# Patient Record
Sex: MALE | Race: WHITE | NOT HISPANIC OR LATINO | URBAN - METROPOLITAN AREA
[De-identification: names, ages, dates, MRNs, and addresses within clinical notes are randomized per-mention and may not be internally consistent; named-entity substitution may affect disease eponyms.]

---

## 2017-03-29 ENCOUNTER — OFFICE (OUTPATIENT)
Dept: URBAN - METROPOLITAN AREA CLINIC 75 | Facility: CLINIC | Age: 62
End: 2017-03-29
Payer: COMMERCIAL

## 2017-03-29 VITALS
SYSTOLIC BLOOD PRESSURE: 134 MMHG | DIASTOLIC BLOOD PRESSURE: 70 MMHG | WEIGHT: 254 LBS | HEART RATE: 81 BPM | HEIGHT: 65 IN

## 2017-03-29 DIAGNOSIS — K30 FUNCTIONAL DYSPEPSIA: ICD-10-CM

## 2017-03-29 DIAGNOSIS — K21.9 GASTRO-ESOPHAGEAL REFLUX DISEASE WITHOUT ESOPHAGITIS: ICD-10-CM

## 2017-03-29 DIAGNOSIS — R13.10 DYSPHAGIA, UNSPECIFIED: ICD-10-CM

## 2017-03-29 DIAGNOSIS — R63.5 ABNORMAL WEIGHT GAIN: ICD-10-CM

## 2017-03-29 DIAGNOSIS — Z83.71 FAMILY HISTORY OF COLONIC POLYPS: ICD-10-CM

## 2017-03-29 DIAGNOSIS — R63.0 ANOREXIA: ICD-10-CM

## 2017-03-29 DIAGNOSIS — K59.1 FUNCTIONAL DIARRHEA: ICD-10-CM

## 2017-03-29 DIAGNOSIS — R14.0 ABDOMINAL DISTENSION (GASEOUS): ICD-10-CM

## 2017-03-29 PROCEDURE — 99244 OFF/OP CNSLTJ NEW/EST MOD 40: CPT | Performed by: NURSE PRACTITIONER

## 2019-11-22 ENCOUNTER — OFFICE VISIT (OUTPATIENT)
Dept: RETAIL CLINIC | Facility: CLINIC | Age: 64
End: 2019-11-22

## 2019-11-22 VITALS
OXYGEN SATURATION: 96 % | SYSTOLIC BLOOD PRESSURE: 110 MMHG | HEART RATE: 84 BPM | DIASTOLIC BLOOD PRESSURE: 70 MMHG | TEMPERATURE: 98.4 F | RESPIRATION RATE: 18 BRPM

## 2019-11-22 DIAGNOSIS — L03.116 CELLULITIS OF LEFT LOWER EXTREMITY: Primary | ICD-10-CM

## 2019-11-22 PROCEDURE — 99213 OFFICE O/P EST LOW 20 MIN: CPT | Performed by: NURSE PRACTITIONER

## 2019-11-22 RX ORDER — CEPHALEXIN 500 MG/1
500 CAPSULE ORAL 3 TIMES DAILY
Qty: 21 CAPSULE | Refills: 0 | Status: SHIPPED | OUTPATIENT
Start: 2019-11-22 | End: 2019-11-29

## 2019-11-22 NOTE — PROGRESS NOTES
Subjective   Marcial Ramirez is a 64 y.o. male.     Lower Extremity Issue   This is a new problem. The current episode started in the past 7 days (thought it was some irritation from tape, but worsening). The problem has been gradually worsening. Associated symptoms include a rash. Pertinent negatives include no fever. Associated symptoms comments: No pain, no numbness, tingling  . Nothing aggravates the symptoms. Treatments tried: hydrocortisone cream. The treatment provided mild relief.        The following portions of the patient's history were reviewed and updated as appropriate: allergies, current medications, past family history, past medical history, past social history, past surgical history and problem list.    Review of Systems   Constitutional: Negative for fever.   HENT: Negative.    Respiratory: Negative.    Cardiovascular: Negative.    Gastrointestinal: Negative.    Genitourinary: Negative.    Musculoskeletal: Negative.    Skin: Positive for rash.        Left lower extremity swelling and redness     Neurological: Negative.        Objective   Physical Exam   Constitutional: He is cooperative. No distress.   HENT:   Head: Normocephalic.   Right Ear: Hearing, tympanic membrane, external ear and ear canal normal.   Left Ear: Hearing, tympanic membrane, external ear and ear canal normal.   Nose: Nose normal.   Mouth/Throat: Oropharynx is clear and moist.   Eyes: Conjunctivae, EOM and lids are normal. Pupils are equal, round, and reactive to light.   Neck: Trachea normal and full passive range of motion without pain.   Cardiovascular: Normal rate, regular rhythm and normal pulses.   Pulmonary/Chest: Effort normal and breath sounds normal.   Neurological: He is alert.   Skin: Skin is warm. Capillary refill takes less than 2 seconds.        Psychiatric: He has a normal mood and affect. His speech is normal and behavior is normal.   Vitals reviewed.        Assessment/Plan   Marcial was seen today for  cellulitis.    Diagnoses and all orders for this visit:    Cellulitis of left lower extremity    Other orders  -     cephalexin (KEFLEX) 500 MG capsule; Take 1 capsule by mouth 3 (Three) Times a Day for 7 days.    If any worsening go to ER ASAP, if no better by Monday, call dermatology who did excision and have them evaluate.

## 2019-11-22 NOTE — PATIENT INSTRUCTIONS
Cellulitis, Adult    Cellulitis is a skin infection. The infected area is usually warm, red, swollen, and tender. This condition occurs most often in the arms and lower legs. The infection can travel to the muscles, blood, and underlying tissue and become serious. It is very important to get treated for this condition.  What are the causes?  Cellulitis is caused by bacteria. The bacteria enter through a break in the skin, such as a cut, burn, insect bite, open sore, or crack.  What increases the risk?  This condition is more likely to occur in people who:  · Have a weak body defense system (immune system).  · Have open wounds on the skin, such as cuts, burns, bites, and scrapes. Bacteria can enter the body through these open wounds.  · Are older than 60 years of age.  · Have diabetes.  · Have a type of long-lasting (chronic) liver disease (cirrhosis) or kidney disease.  · Are obese.  · Have a skin condition such as:  ? Itchy rash (eczema).  ? Slow movement of blood in the veins (venous stasis).  ? Fluid buildup below the skin (edema).  · Have had radiation therapy.  · Use IV drugs.  What are the signs or symptoms?  Symptoms of this condition include:  · Redness, streaking, or spotting on the skin.  · Swollen area of the skin.  · Tenderness or pain when an area of the skin is touched.  · Warm skin.  · A fever.  · Chills.  · Blisters.  How is this diagnosed?  This condition is diagnosed based on a medical history and physical exam. You may also have tests, including:  · Blood tests.  · Imaging tests.  How is this treated?  Treatment for this condition may include:  · Medicines, such as antibiotic medicines or medicines to treat allergies (antihistamines).  · Supportive care, such as rest and application of cold or warm cloths (compresses) to the skin.  · Hospital care, if the condition is severe.  The infection usually starts to get better within 1-2 days of treatment.  Follow these instructions at  home:    Medicines  · Take over-the-counter and prescription medicines only as told by your health care provider.  · If you were prescribed an antibiotic medicine, take it as told by your health care provider. Do not stop taking the antibiotic even if you start to feel better.  General instructions  · Drink enough fluid to keep your urine pale yellow.  · Do not touch or rub the infected area.  · Raise (elevate) the infected area above the level of your heart while you are sitting or lying down.  · Apply warm or cold compresses to the affected area as told by your health care provider.  · Keep all follow-up visits as told by your health care provider. This is important. These visits let your health care provider make sure a more serious infection is not developing.  Contact a health care provider if:  · You have a fever.  · Your symptoms do not begin to improve within 1-2 days of starting treatment.  · Your bone or joint underneath the infected area becomes painful after the skin has healed.  · Your infection returns in the same area or another area.  · You notice a swollen bump in the infected area.  · You develop new symptoms.  · You have a general ill feeling (malaise) with muscle aches and pains.  Get help right away if:  · Your symptoms get worse.  · You feel very sleepy.  · You develop vomiting or diarrhea that persists.  · You notice red streaks coming from the infected area.  · Your red area gets larger or turns dark in color.  These symptoms may represent a serious problem that is an emergency. Do not wait to see if the symptoms will go away. Get medical help right away. Call your local emergency services (911 in the [place] Do not drive yourself to the hospital.  Summary  · Cellulitis is a skin infection. This condition occurs most often in the arms and lower legs.  · Treatment for this condition may include medicines, such as antibiotic medicines or antihistamines.  · Take over-the-counter and prescription  medicines only as told by your health care provider. If you were prescribed an antibiotic medicine, do not stop taking the antibiotic even if you start to feel better.  · Contact a health care provider if your symptoms do not begin to improve within 1-2 days of starting treatment or your symptoms get worse.  · Keep all follow-up visits as told by your health care provider. This is important. These visits let your health care provider make sure that a more serious infection is not developing.  This information is not intended to replace advice given to you by your health care provider. Make sure you discuss any questions you have with your health care provider.  Document Released: 09/27/2006 Document Revised: 05/09/2019 Document Reviewed: 05/09/2019  [employer] Interactive Patient Education © 2019 [employer]

## 2020-10-12 ENCOUNTER — HOSPITAL ENCOUNTER (EMERGENCY)
Facility: HOSPITAL | Age: 65
Discharge: LEFT BEFORE DISCHARGE | End: 2020-10-12

## 2020-10-12 VITALS — RESPIRATION RATE: 16 BRPM | TEMPERATURE: 98.2 F | HEART RATE: 98 BPM | OXYGEN SATURATION: 93 %

## 2020-10-12 NOTE — ED TRIAGE NOTES
Feels soa c exertion.  Seen at  today.  hsa been coughing, sneezing.        Patient was placed in face mask during first look triage.  Patient was wearing a face mask throughout encounter.  I wore personal protective equipment throughout the encounter.  Hand hygiene was performed before and after patient encounter.

## 2020-10-13 ENCOUNTER — APPOINTMENT (OUTPATIENT)
Dept: GENERAL RADIOLOGY | Facility: HOSPITAL | Age: 65
End: 2020-10-13

## 2020-10-13 ENCOUNTER — HOSPITAL ENCOUNTER (EMERGENCY)
Facility: HOSPITAL | Age: 65
Discharge: ANOTHER HOSPITAL | End: 2020-10-13
Attending: EMERGENCY MEDICINE | Admitting: EMERGENCY MEDICINE

## 2020-10-13 ENCOUNTER — APPOINTMENT (OUTPATIENT)
Dept: CT IMAGING | Facility: HOSPITAL | Age: 65
End: 2020-10-13

## 2020-10-13 VITALS
TEMPERATURE: 98.9 F | HEART RATE: 93 BPM | HEIGHT: 65 IN | SYSTOLIC BLOOD PRESSURE: 168 MMHG | WEIGHT: 261 LBS | DIASTOLIC BLOOD PRESSURE: 95 MMHG | OXYGEN SATURATION: 93 % | RESPIRATION RATE: 18 BRPM | BODY MASS INDEX: 43.49 KG/M2

## 2020-10-13 DIAGNOSIS — R06.09 EXERTIONAL DYSPNEA: Primary | ICD-10-CM

## 2020-10-13 LAB
ALBUMIN SERPL-MCNC: 4 G/DL (ref 3.5–5.2)
ALBUMIN/GLOB SERPL: 1.2 G/DL
ALP SERPL-CCNC: 65 U/L (ref 39–117)
ALT SERPL W P-5'-P-CCNC: 27 U/L (ref 1–41)
ANION GAP SERPL CALCULATED.3IONS-SCNC: 10.8 MMOL/L (ref 5–15)
AST SERPL-CCNC: 26 U/L (ref 1–40)
B PARAPERT DNA SPEC QL NAA+PROBE: NOT DETECTED
B PERT DNA SPEC QL NAA+PROBE: NOT DETECTED
BASOPHILS # BLD AUTO: 0.06 10*3/MM3 (ref 0–0.2)
BASOPHILS NFR BLD AUTO: 0.7 % (ref 0–1.5)
BILIRUB SERPL-MCNC: 0.6 MG/DL (ref 0–1.2)
BUN SERPL-MCNC: 15 MG/DL (ref 8–23)
BUN/CREAT SERPL: 16.3 (ref 7–25)
C PNEUM DNA NPH QL NAA+NON-PROBE: NOT DETECTED
CALCIUM SPEC-SCNC: 9.6 MG/DL (ref 8.6–10.5)
CHLORIDE SERPL-SCNC: 101 MMOL/L (ref 98–107)
CO2 SERPL-SCNC: 27.2 MMOL/L (ref 22–29)
CREAT SERPL-MCNC: 0.92 MG/DL (ref 0.76–1.27)
D DIMER PPP FEU-MCNC: <0.27 MCGFEU/ML (ref 0–0.49)
D-LACTATE SERPL-SCNC: 1.2 MMOL/L (ref 0.5–2)
DEPRECATED RDW RBC AUTO: 39.9 FL (ref 37–54)
EOSINOPHIL # BLD AUTO: 0.18 10*3/MM3 (ref 0–0.4)
EOSINOPHIL NFR BLD AUTO: 2.2 % (ref 0.3–6.2)
ERYTHROCYTE [DISTWIDTH] IN BLOOD BY AUTOMATED COUNT: 12.3 % (ref 12.3–15.4)
FLUAV H1 2009 PAND RNA NPH QL NAA+PROBE: NOT DETECTED
FLUAV H1 HA GENE NPH QL NAA+PROBE: NOT DETECTED
FLUAV H3 RNA NPH QL NAA+PROBE: NOT DETECTED
FLUAV SUBTYP SPEC NAA+PROBE: NOT DETECTED
FLUBV RNA ISLT QL NAA+PROBE: NOT DETECTED
GFR SERPL CREATININE-BSD FRML MDRD: 82 ML/MIN/1.73
GLOBULIN UR ELPH-MCNC: 3.4 GM/DL
GLUCOSE SERPL-MCNC: 87 MG/DL (ref 65–99)
HADV DNA SPEC NAA+PROBE: NOT DETECTED
HCOV 229E RNA SPEC QL NAA+PROBE: NOT DETECTED
HCOV HKU1 RNA SPEC QL NAA+PROBE: NOT DETECTED
HCOV NL63 RNA SPEC QL NAA+PROBE: NOT DETECTED
HCOV OC43 RNA SPEC QL NAA+PROBE: NOT DETECTED
HCT VFR BLD AUTO: 48 % (ref 37.5–51)
HGB BLD-MCNC: 16.6 G/DL (ref 13–17.7)
HMPV RNA NPH QL NAA+NON-PROBE: NOT DETECTED
HOLD SPECIMEN: NORMAL
HOLD SPECIMEN: NORMAL
HPIV1 RNA SPEC QL NAA+PROBE: NOT DETECTED
HPIV2 RNA SPEC QL NAA+PROBE: NOT DETECTED
HPIV3 RNA NPH QL NAA+PROBE: NOT DETECTED
HPIV4 P GENE NPH QL NAA+PROBE: NOT DETECTED
IMM GRANULOCYTES # BLD AUTO: 0.05 10*3/MM3 (ref 0–0.05)
IMM GRANULOCYTES NFR BLD AUTO: 0.6 % (ref 0–0.5)
LYMPHOCYTES # BLD AUTO: 2.13 10*3/MM3 (ref 0.7–3.1)
LYMPHOCYTES NFR BLD AUTO: 26.4 % (ref 19.6–45.3)
M PNEUMO IGG SER IA-ACNC: NOT DETECTED
MCH RBC QN AUTO: 31 PG (ref 26.6–33)
MCHC RBC AUTO-ENTMCNC: 34.6 G/DL (ref 31.5–35.7)
MCV RBC AUTO: 89.7 FL (ref 79–97)
MONOCYTES # BLD AUTO: 0.6 10*3/MM3 (ref 0.1–0.9)
MONOCYTES NFR BLD AUTO: 7.4 % (ref 5–12)
NEUTROPHILS NFR BLD AUTO: 5.05 10*3/MM3 (ref 1.7–7)
NEUTROPHILS NFR BLD AUTO: 62.7 % (ref 42.7–76)
NRBC BLD AUTO-RTO: 0 /100 WBC (ref 0–0.2)
NT-PROBNP SERPL-MCNC: 44.3 PG/ML (ref 0–900)
PLATELET # BLD AUTO: 202 10*3/MM3 (ref 140–450)
PMV BLD AUTO: 8.4 FL (ref 6–12)
POTASSIUM SERPL-SCNC: 4.1 MMOL/L (ref 3.5–5.2)
PROCALCITONIN SERPL-MCNC: 0.09 NG/ML (ref 0–0.25)
PROT SERPL-MCNC: 7.4 G/DL (ref 6–8.5)
RBC # BLD AUTO: 5.35 10*6/MM3 (ref 4.14–5.8)
RHINOVIRUS RNA SPEC NAA+PROBE: DETECTED
RSV RNA NPH QL NAA+NON-PROBE: NOT DETECTED
SARS-COV-2 RNA NPH QL NAA+NON-PROBE: NOT DETECTED
SODIUM SERPL-SCNC: 139 MMOL/L (ref 136–145)
TROPONIN T SERPL-MCNC: <0.01 NG/ML (ref 0–0.03)
WBC # BLD AUTO: 8.07 10*3/MM3 (ref 3.4–10.8)
WHOLE BLOOD HOLD SPECIMEN: NORMAL
WHOLE BLOOD HOLD SPECIMEN: NORMAL

## 2020-10-13 PROCEDURE — 36415 COLL VENOUS BLD VENIPUNCTURE: CPT

## 2020-10-13 PROCEDURE — 99283 EMERGENCY DEPT VISIT LOW MDM: CPT

## 2020-10-13 PROCEDURE — 85025 COMPLETE CBC W/AUTO DIFF WBC: CPT | Performed by: EMERGENCY MEDICINE

## 2020-10-13 PROCEDURE — 80053 COMPREHEN METABOLIC PANEL: CPT | Performed by: EMERGENCY MEDICINE

## 2020-10-13 PROCEDURE — 83880 ASSAY OF NATRIURETIC PEPTIDE: CPT | Performed by: EMERGENCY MEDICINE

## 2020-10-13 PROCEDURE — 85379 FIBRIN DEGRADATION QUANT: CPT | Performed by: EMERGENCY MEDICINE

## 2020-10-13 PROCEDURE — 84145 PROCALCITONIN (PCT): CPT | Performed by: PHYSICIAN ASSISTANT

## 2020-10-13 PROCEDURE — 0202U NFCT DS 22 TRGT SARS-COV-2: CPT | Performed by: PHYSICIAN ASSISTANT

## 2020-10-13 PROCEDURE — 93005 ELECTROCARDIOGRAM TRACING: CPT

## 2020-10-13 PROCEDURE — 93005 ELECTROCARDIOGRAM TRACING: CPT | Performed by: EMERGENCY MEDICINE

## 2020-10-13 PROCEDURE — 71046 X-RAY EXAM CHEST 2 VIEWS: CPT

## 2020-10-13 PROCEDURE — 84484 ASSAY OF TROPONIN QUANT: CPT | Performed by: EMERGENCY MEDICINE

## 2020-10-13 PROCEDURE — 83605 ASSAY OF LACTIC ACID: CPT | Performed by: PHYSICIAN ASSISTANT

## 2020-10-13 PROCEDURE — 93010 ELECTROCARDIOGRAM REPORT: CPT | Performed by: INTERNAL MEDICINE

## 2020-10-13 RX ORDER — SODIUM CHLORIDE 0.9 % (FLUSH) 0.9 %
10 SYRINGE (ML) INJECTION AS NEEDED
Status: DISCONTINUED | OUTPATIENT
Start: 2020-10-13 | End: 2020-10-13 | Stop reason: HOSPADM

## 2020-10-13 NOTE — ED NOTES
Patient has on mask, nurse has on mask, gown, safety glasses and face shield. I washed hands before and after entering the room.     Nataly Nielson, RN  10/13/20 1838

## 2020-10-13 NOTE — ED PROVIDER NOTES
I supervised care provided by the midlevel provider.   We have discussed this patient's history, physical exam, and treatment plan.  I have reviewed the note and personally saw and examined the patient and agree with the plan of care.   I have seen and evaluated this gentleman.  He has a 4-day history of an increased cough, shortness of breath.  His shortness of breath is really with exertion.  It is not much exertion at all just getting up to walk across the room he becomes very short of breath.  He is currently not an active smoker but is smoked in the past.  He reports no history of asthma or COPD.  He reports no history of heart disease.  He does not see a doctor regularly.    GENERAL: Patient's O2 sat on room air is 93%.  Patient's heart rate is 90 low 100s and his blood pressure is a little high.  Patient is afebrile here  HENT: nares patent  Head/neck/ face are symmetric without gross deformity or swelling  EYES: no scleral icterus  CV: regular rhythm, regular rate with intact distal pulses no murmur or rub  RESPIRATORY: Mild respiratory distress.  Patient has a mild cough.  Patient has a very mild expiratory wheeze.  No rales or rhonchi appreciated in lung fields.  ABDOMEN: soft and non-tender morbidly obese  MUSCULOSKELETAL: no deformity.  1+ edema to ankles and feet.  Intact distal pulses.  NEURO: alert and appropriate, moves all extremities, follows commands  SKIN: warm, dry    Vital signs and nursing notes reviewed.    Plan I reviewed EKG and lab work.  Chest x-ray also was reviewed and essentially unremarkable.  No acute process has some elevation of the right hemidiaphragm.  We tried to get a CT angiogram of his chest.  Patient could not lay flat.  Patient has a long history of inability to lay flat.  He sleeps sitting up at baseline.  This occurs suddenly and I think this is likely related to anxiety.    I informed patient that he will need to be admitted to the hospital.  We have a dimer pending.  If  the dimer is negative we will not get a CT angiogram.  I spoke with the patient at length and informed him of my concerns.  All questions answered.  We are currently under a pandemic from the COVID19 infection.  The patient presented to the emergency department by ambulance or personal vehicle. I followed the current protocols required by Infection Control at [facility] in my evaluation and treatment of the patient. The patient was wearing a face mask during my evaluation and throughout my encounter. During my whole encounter with this patient I used appropriate personal protective equipment.  This equipment consisted of eye protection, facemask, gown, and gloves.  I applied this equipment before entering the room.      I reviewed old records.  No previous work-up of cardiac.  See that he is got chronic nausea.  ED Course as of Oct 13 1619   Tue Oct 13, 2020   1147 Patient presents with 5-day history of exertional dyspnea, cough.  Differential diagnoses include but not limited to congestive heart failure, COVID-19, pneumonia, allergic rhinitis.    [EE]   1155 Chest x-ray interpreted by myself shows no acute effusion or infiltrate.  Patient does have an elevated right hemidiaphragm.  I will await final radiologist interpretation.    [EE]   1251 Patient with normal chest x-ray and EKG.  I am concerned that patient is still tachycardic.  Plan to obtain CTA to rule out pulmonary embolism.    [EE]   1251 WBC: 8.07 [EE]   1251 Hemoglobin: 16.6 [EE]   1420 Patient unable to tolerate CT scan secondary to Gary flap.  Will obtain D-dimer and reevaluate.    [EE]   1504 Human Rhinovirus/Enterovirus(!): Detected [EE]   1504 D-Dimer, Quant: <0.27 [EE]   1513 I have reevaluated the patient.  The dimer is negative.  It makes it incredibly unlikely that he has a DVT or PE.  I was going to admit the patient here to our hospital but the patient does not have insurance that is covered here.  I informed him that I am  going to put on the chart that I do not think that is medically safe for him to leave or to be transferred.  I informed him I have no definitive certainty that the insurance company will not charge him.  He states that he does not want to take that risk.  And that he is going to get up and leave AGAINST MEDICAL ADVICE.  I stated the next best alternative is for me to try to arrange transfer with an ACLS crew to the hospital.  [facility] and [facility] is their choice.  Given the fact that he will not stay here and I think it is a much greater risk if he leaves A then if he was transferred to another hospital with an ACLS unit.  I will try to appeal to his wishes and arrange that.  I discussed with him as well as his brother and sister-in-law.    [MM]   1554 I spoke with Abbie who is the nurse practitioner that is on for the hospitalist at [facility].  She spoke with Dr. Egan and they accepted this patient in transfer.  We can transfer the patient by ACLS.  I explained to Abbie the patient's presenting symptoms and the results of her test here.    [MM]   1555 Patient's dimer is negative so I canceled the venous Doppler.    [MM]   1555 I was going to do the Doppler because the patient could not lay flat for a CT scan.  It was an indirect way to look and evaluate for DVTs.  As his dimer came back negative and he has had no new swelling or pain in his lower extremities we will cancel the venous Doppler.    [MM]      ED Course User Index  [EE] Evans Carvajal PA  [MM] Myron Lucero MD     Patient is remained stable down here in the ER prior to transfer.     Myron Lucero MD  10/13/20 1904

## 2020-10-13 NOTE — ED NOTES
Patient waiting to be transferred, Deric was unsure about bed status. Patient updated.      Nataly Nielson, RN  10/13/20 [id number]

## 2020-10-13 NOTE — PROGRESS NOTES
[facility] informed of need to transport by ALS to [facility], Room 731. ETA [phone]. Galina Arredondo RN

## 2020-10-13 NOTE — PROGRESS NOTES
"After decision that patient needs to be admitted and is not stable enough for transfer at present time per Dr. Lucero, spoke to patient on phone and explained that non-par insurance will NOT affect ER visit, but his insurance is out of network for an inpatient stay. Explained that he may be approached again about ? transfer at some point during his hospital stay. Explained that this CM could attempt to arrange transfer to a [facility], [facility] or [facility]. Stated that he did not want to do this and asked to be discharged, stating that he would go to one of those facilities \"if he started feeling bad again\". Explained that this may involved signing out AMA, rather than being discharged, and that Dr. Cuevas would speak w/ him. Dr. Lucero updated  "

## 2020-10-13 NOTE — ED PROVIDER NOTES
EMERGENCY DEPARTMENT ENCOUNTER    Room Number:  17/17  Date of encounter:  10/13/2020  PCP: Provider, No Known  Historian: Patient      I used full protective equipment while examining this patient.  This includes face mask, gloves and protective eyewear.  I washed my hands before entering the room and immediately upon leaving the room      HPI:  Chief Complaint: Shortness of breath  A complete HPI/ROS/PMH/PSH/SH/FH are unobtainable due to: Nothing    Context: Marcial Ramirez is a 65 y.o. male who presents to the ED c/o approximate 5-day history of exertional dyspnea as well as cough.  Patient states the symptoms started mildly and have progressed.  Patient states that this time he has difficulty ambulating further than 20 feet without becoming extremely dyspneic.  Patient states that at rest he feels much better.  He states he does have chronic orthopnea, which he has always related to his weight.  Patient states he has had a mildly productive cough.  He denies any fever, sore throat, anosmia, myalgias.  He denies any significant pedal edema or chest pain.  He denies any known Covid exposure.    Review of Medical Records  I reviewed urgent care visit from 10/12/2020.  Patient was encouraged to be seen in the ER.    PAST MEDICAL HISTORY  Active Ambulatory Problems     Diagnosis Date Noted   • Allergic rhinitis due to allergen 10/10/2016   • Cough 10/10/2016   • Nausea 10/10/2016   • Edema 10/10/2016   • Obesity due to excess calories 10/10/2016     Resolved Ambulatory Problems     Diagnosis Date Noted   • No Resolved Ambulatory Problems     Past Medical History:   Diagnosis Date   • GERD (gastroesophageal reflux disease)          PAST SURGICAL HISTORY  Past Surgical History:   Procedure Laterality Date   • HERNIA REPAIR     • SKIN CANCER EXCISION      left lower leg         FAMILY HISTORY  Family History   Problem Relation Age of Onset   • Heart disease Mother    • Heart failure Mother    • Heart disease Father    • COPD  Father    • Stroke Brother          SOCIAL HISTORY  Social History     Socioeconomic History   • Marital status:      Spouse name: Not on file   • Number of children: Not on file   • Years of education: Not on file   • Highest education level: Not on file   Substance and Sexual Activity   • Alcohol use: Yes     Alcohol/week: 21.0 standard drinks     Types: 21 Glasses of wine per week   • Drug use: No   • Sexual activity: Defer         ALLERGIES  Patient has no known allergies.        REVIEW OF SYSTEMS  All systems reviewed and negative except for those discussed in HPI.       PHYSICAL EXAM    I have reviewed the triage vital signs and nursing notes.    ED Triage Vitals [10/13/20 0910]   Temp Heart Rate Resp BP SpO2   97.6 °F (36.4 °C) 116 18 -- 93 %      Temp src Heart Rate Source Patient Position BP Location FiO2 (%)   Tympanic Monitor -- -- --       Physical Exam  GENERAL: Alert, oriented, not distressed  HENT: nares patent, head atraumatic  EYES: no scleral icterus, EOMI  CV: regular rhythm, tachycardic rate, no murmur  RESPIRATORY: normal effort, CTA  ABDOMEN: soft, nontender, obese  MUSCULOSKELETAL: no deformity, FROM, mild pedal edema  NEURO: alert, moves all extremities, follows commands  SKIN: warm, dry, mild hyperpigmentation of the lower extremities        LAB RESULTS  Recent Results (from the past 24 hour(s))   Comprehensive Metabolic Panel    Collection Time: 10/13/20 12:22 PM    Specimen: Blood   Result Value Ref Range    Glucose 87 65 - 99 mg/dL    BUN 15 8 - 23 mg/dL    Creatinine 0.92 0.76 - 1.27 mg/dL    Sodium 139 136 - 145 mmol/L    Potassium 4.1 3.5 - 5.2 mmol/L    Chloride 101 98 - 107 mmol/L    CO2 27.2 22.0 - 29.0 mmol/L    Calcium 9.6 8.6 - 10.5 mg/dL    Total Protein 7.4 6.0 - 8.5 g/dL    Albumin 4.00 3.50 - 5.20 g/dL    ALT (SGPT) 27 1 - 41 U/L    AST (SGOT) 26 1 - 40 U/L    Alkaline Phosphatase 65 39 - 117 U/L    Total Bilirubin 0.6 0.0 - 1.2 mg/dL    eGFR Non African Amer 82 >60  mL/min/1.73    Globulin 3.4 gm/dL    A/G Ratio 1.2 g/dL    BUN/Creatinine Ratio 16.3 7.0 - 25.0    Anion Gap 10.8 5.0 - 15.0 mmol/L   BNP    Collection Time: 10/13/20 12:22 PM    Specimen: Blood   Result Value Ref Range    proBNP 44.3 0.0 - 900.0 pg/mL   Troponin    Collection Time: 10/13/20 12:22 PM    Specimen: Blood   Result Value Ref Range    Troponin T <0.010 0.000 - 0.030 ng/mL   Light Blue Top    Collection Time: 10/13/20 12:22 PM   Result Value Ref Range    Extra Tube hold for add-on    Green Top (Gel)    Collection Time: 10/13/20 12:22 PM   Result Value Ref Range    Extra Tube Hold for add-ons.    Lavender Top    Collection Time: 10/13/20 12:22 PM   Result Value Ref Range    Extra Tube hold for add-on    Gold Top - SST    Collection Time: 10/13/20 12:22 PM   Result Value Ref Range    Extra Tube Hold for add-ons.    CBC Auto Differential    Collection Time: 10/13/20 12:22 PM    Specimen: Blood   Result Value Ref Range    WBC 8.07 3.40 - 10.80 10*3/mm3    RBC 5.35 4.14 - 5.80 10*6/mm3    Hemoglobin 16.6 13.0 - 17.7 g/dL    Hematocrit 48.0 37.5 - 51.0 %    MCV 89.7 79.0 - 97.0 fL    MCH 31.0 26.6 - 33.0 pg    MCHC 34.6 31.5 - 35.7 g/dL    RDW 12.3 12.3 - 15.4 %    RDW-SD 39.9 37.0 - 54.0 fl    MPV 8.4 6.0 - 12.0 fL    Platelets 202 140 - 450 10*3/mm3    Neutrophil % 62.7 42.7 - 76.0 %    Lymphocyte % 26.4 19.6 - 45.3 %    Monocyte % 7.4 5.0 - 12.0 %    Eosinophil % 2.2 0.3 - 6.2 %    Basophil % 0.7 0.0 - 1.5 %    Immature Grans % 0.6 (H) 0.0 - 0.5 %    Neutrophils, Absolute 5.05 1.70 - 7.00 10*3/mm3    Lymphocytes, Absolute 2.13 0.70 - 3.10 10*3/mm3    Monocytes, Absolute 0.60 0.10 - 0.90 10*3/mm3    Eosinophils, Absolute 0.18 0.00 - 0.40 10*3/mm3    Basophils, Absolute 0.06 0.00 - 0.20 10*3/mm3    Immature Grans, Absolute 0.05 0.00 - 0.05 10*3/mm3    nRBC 0.0 0.0 - 0.2 /100 WBC   Procalcitonin    Collection Time: 10/13/20 12:22 PM    Specimen: Blood   Result Value Ref Range    Procalcitonin 0.09 0.00 - 0.25  ng/mL   Lactic Acid, Plasma    Collection Time: 10/13/20 12:22 PM    Specimen: Blood   Result Value Ref Range    Lactate 1.2 0.5 - 2.0 mmol/L   Respiratory Panel PCR w/COVID-19(SARS-CoV-2) [facility] In-House, NP Swab in UTM/VTM, 3-4 HR TAT - Swab, Nasopharynx    Collection Time: 10/13/20 12:24 PM    Specimen: Nasopharynx; Swab   Result Value Ref Range    ADENOVIRUS, PCR Not Detected Not Detected    Coronavirus 229E Not Detected Not Detected    Coronavirus HKU1 Not Detected Not Detected    Coronavirus NL63 Not Detected Not Detected    Coronavirus OC43 Not Detected Not Detected    COVID19 Not Detected Not Detected - Ref. Range    Human Metapneumovirus Not Detected Not Detected    Human Rhinovirus/Enterovirus Detected (A) Not Detected    Influenza A PCR Not Detected Not Detected    Influenza A H1 Not Detected Not Detected    Influenza A H1 2009 PCR Not Detected Not Detected    Influenza A H3 Not Detected Not Detected    Influenza B PCR Not Detected Not Detected    Parainfluenza Virus 1 Not Detected Not Detected    Parainfluenza Virus 2 Not Detected Not Detected    Parainfluenza Virus 3 Not Detected Not Detected    Parainfluenza Virus 4 Not Detected Not Detected    RSV, PCR Not Detected Not Detected    Bordetella pertussis pcr Not Detected Not Detected    Bordetella parapertussis PCR Not Detected Not Detected    Chlamydophila pneumoniae PCR Not Detected Not Detected    Mycoplasma pneumo by PCR Not Detected Not Detected   D-dimer, Quantitative    Collection Time: 10/13/20  2:32 PM    Specimen: Blood   Result Value Ref Range    D-Dimer, Quantitative <0.27 0.00 - 0.49 MCGFEU/mL       Ordered the above labs and independently reviewed the results.        RADIOLOGY  Xr Chest 2 View    Result Date: 10/13/2020  XR CHEST 2 VW-  HISTORY:  Shortness of air, congestion.  COMPARISON:  None.  FINDINGS:  A single portable view of the chest was obtained. The cardiac silhouette and mediastinal and hilar contours are within  normal limits. There is calcific aortic atherosclerosis. There is mild linear subsegmental atelectasis versus scarring in the right lung base. There is no focal consolidation. Pleural spaces are clear. There is asymmetric elevation of the right hemidiaphragm. There are chronic appearing thoracolumbar compression fractures. There is multilevel degenerative disc disease.  CONCLUSION(S):   1.  Asymmetric elevation of the right hemidiaphragm with adjacent subsegmental atelectasis versus scarring. No focal consolidation or effusion.  This report was finalized on 10/13/2020 12:05 PM by Dr. Emely Flowers M.D.        I ordered the above noted radiological studies. Reviewed by me and discussed with radiologist.  See dictation for official radiology interpretation.      MEDICATIONS GIVEN IN ER    Medications   sodium chloride 0.9 % flush 10 mL (has no administration in time range)         PROGRESS, DATA ANALYSIS, CONSULTS, AND MEDICAL DECISION MAKING    All labs have been independently reviewed by me.  All radiology studies have been reviewed by me and discussed with radiologist dictating the report.   EKG's independently viewed and interpreted by me.  Discussion below represents my analysis of pertinent findings related to patient's condition, differential diagnosis, treatment plan and final disposition.    I have discussed case with Dr. Lucero, emergency room physician.  He has performed his own bedside examination and agrees with treatment plan.    ED Course as of Oct 13 1918   Tue Oct 13, 2020   1147 Patient presents with 5-day history of exertional dyspnea, cough.  Differential diagnoses include but not limited to congestive heart failure, COVID-19, pneumonia, allergic rhinitis.    [EE]   1155 Chest x-ray interpreted by myself shows no acute effusion or infiltrate.  Patient does have an elevated right hemidiaphragm.  I will await final radiologist interpretation.    [EE]   1251 Patient with normal chest x-ray and EKG.   I am concerned that patient is still tachycardic.  Plan to obtain CTA to rule out pulmonary embolism.    [EE]   1251 WBC: 8.07 [EE]   1251 Hemoglobin: 16.6 [EE]   1420 Patient unable to tolerate CT scan secondary to Gary flap.  Will obtain D-dimer and reevaluate.    [EE]   1504 Human Rhinovirus/Enterovirus(!): Detected [EE]   1504 D-Dimer, Quant: <0.27 [EE]   1513 I have reevaluated the patient.  The dimer is negative.  It makes it incredibly unlikely that he has a DVT or PE.  I was going to admit the patient here to our hospital but the patient does not have insurance that is covered here.  I informed him that I am going to put on the chart that I do not think that is medically safe for him to leave or to be transferred.  I informed him I have no definitive certainty that the insurance company will not charge him.  He states that he does not want to take that risk.  And that he is going to get up and leave AGAINST MEDICAL ADVICE.  I stated the next best alternative is for me to try to arrange transfer with an ACLS crew to the hospital.  [facility] and [facility] is their choice.  Given the fact that he will not stay here and I think it is a much greater risk if he leaves A then if he was transferred to another hospital with an ACLS unit.  I will try to appeal to his wishes and arrange that.  I discussed with him as well as his brother and sister-in-law.    [MM]   1554 I spoke with Abbie who is the nurse practitioner that is on for the hospitalist at [facility].  She spoke with Dr. Egan and they accepted this patient in transfer.  We can transfer the patient by ACLS.  I explained to Abbie the patient's presenting symptoms and the results of her test here.    [MM]   1555 Patient's dimer is negative so I canceled the venous Doppler.    [MM]   1555 I was going to do the Doppler because the patient could not lay flat for a CT scan.  It was an indirect way to look and evaluate for DVTs.  As  his dimer came back negative and he has had no new swelling or pain in his lower extremities we will cancel the venous Doppler.    [MM]      ED Course User Index  [EE] Evans Carvajal PA  [MM] Myron Lucero MD       AS OF 19:18 EDT VITALS:    BP - 160/90  HR - 116  TEMP - 97.6 °F (36.4 °C) (Tympanic)  O2 SATS - 93%        DIAGNOSIS  Final diagnoses:   Exertional dyspnea         DISPOSITION  Transferred           Evans Carvajal PA  10/13/20 1918

## 2020-10-13 NOTE — PROGRESS NOTES
Dr. Lucero spoke w/ patient and family, and patient requests to be transferred to [facility]. Per request, called [facility] at [place] and spoke w/ Inocencia. Requested that hospitalist call Dr. Lucero to discuss transfer. Inocencia stated that they have no open beds at present time, but may have some discharges this evening. Dr. Lucero updated

## 2022-01-11 ENCOUNTER — IMMUNIZATION (OUTPATIENT)
Dept: VACCINE CLINIC | Facility: HOSPITAL | Age: 67
End: 2022-01-11

## 2022-01-11 PROCEDURE — 0004A HC ADM SARSCOV2 30MCG/0.3ML BOOSTER: CPT | Performed by: INTERNAL MEDICINE

## 2022-01-11 PROCEDURE — 91300 HC SARSCOV02 VAC 30MCG/0.3ML IM: CPT | Performed by: INTERNAL MEDICINE

## 2024-02-23 ENCOUNTER — HOSPITAL ENCOUNTER (EMERGENCY)
Facility: HOSPITAL | Age: 69
Discharge: HOME | End: 2024-02-23
Attending: STUDENT IN AN ORGANIZED HEALTH CARE EDUCATION/TRAINING PROGRAM
Payer: MEDICARE

## 2024-02-23 ENCOUNTER — APPOINTMENT (OUTPATIENT)
Dept: GENERAL RADIOLOGY | Facility: HOSPITAL | Age: 69
End: 2024-02-23
Payer: MEDICARE

## 2024-02-23 VITALS
OXYGEN SATURATION: 94 % | HEART RATE: 66 BPM | BODY MASS INDEX: 39.15 KG/M2 | HEIGHT: 65 IN | TEMPERATURE: 98.1 F | SYSTOLIC BLOOD PRESSURE: 148 MMHG | DIASTOLIC BLOOD PRESSURE: 91 MMHG | RESPIRATION RATE: 16 BRPM | WEIGHT: 235 LBS

## 2024-02-23 DIAGNOSIS — I10 HYPERTENSION, UNSPECIFIED TYPE: ICD-10-CM

## 2024-02-23 DIAGNOSIS — R07.9 CHEST PAIN, UNSPECIFIED TYPE: Primary | ICD-10-CM

## 2024-02-23 LAB
ALBUMIN SERPL-MCNC: 4 G/DL (ref 3.5–5.2)
ALBUMIN/GLOB SERPL: 1.3 G/DL
ALP SERPL-CCNC: 73 U/L (ref 39–117)
ALT SERPL W P-5'-P-CCNC: 11 U/L (ref 1–41)
ANION GAP SERPL CALCULATED.3IONS-SCNC: 15.2 MMOL/L (ref 5–15)
AST SERPL-CCNC: 8 U/L (ref 1–40)
BASOPHILS # BLD AUTO: 0.05 10*3/MM3 (ref 0–0.2)
BASOPHILS NFR BLD AUTO: 0.5 % (ref 0–1.5)
BILIRUB SERPL-MCNC: 0.8 MG/DL (ref 0–1.2)
BUN SERPL-MCNC: 10 MG/DL (ref 8–23)
BUN/CREAT SERPL: 12 (ref 7–25)
CALCIUM SPEC-SCNC: 8.5 MG/DL (ref 8.6–10.5)
CHLORIDE SERPL-SCNC: 108 MMOL/L (ref 98–107)
CO2 SERPL-SCNC: 18.8 MMOL/L (ref 22–29)
CREAT SERPL-MCNC: 0.83 MG/DL (ref 0.76–1.27)
D DIMER PPP FEU-MCNC: 0.27 MCGFEU/ML (ref 0–0.71)
DEPRECATED RDW RBC AUTO: 38.4 FL (ref 37–54)
EGFRCR SERPLBLD CKD-EPI 2021: 93.6 ML/MIN/1.73
EOSINOPHIL # BLD AUTO: 0.04 10*3/MM3 (ref 0–0.4)
EOSINOPHIL NFR BLD AUTO: 0.4 % (ref 0.3–6.2)
ERYTHROCYTE [DISTWIDTH] IN BLOOD BY AUTOMATED COUNT: 12 % (ref 12.3–15.4)
GLOBULIN UR ELPH-MCNC: 3 GM/DL
GLUCOSE SERPL-MCNC: 92 MG/DL (ref 65–99)
HCT VFR BLD AUTO: 47.1 % (ref 37.5–51)
HGB BLD-MCNC: 15.7 G/DL (ref 13–17.7)
IMM GRANULOCYTES # BLD AUTO: 0.05 10*3/MM3 (ref 0–0.05)
IMM GRANULOCYTES NFR BLD AUTO: 0.5 % (ref 0–0.5)
LIPASE SERPL-CCNC: 21 U/L (ref 13–60)
LYMPHOCYTES # BLD AUTO: 1.18 10*3/MM3 (ref 0.7–3.1)
LYMPHOCYTES NFR BLD AUTO: 11.2 % (ref 19.6–45.3)
MCH RBC QN AUTO: 29.3 PG (ref 26.6–33)
MCHC RBC AUTO-ENTMCNC: 33.3 G/DL (ref 31.5–35.7)
MCV RBC AUTO: 87.9 FL (ref 79–97)
MONOCYTES # BLD AUTO: 0.49 10*3/MM3 (ref 0.1–0.9)
MONOCYTES NFR BLD AUTO: 4.6 % (ref 5–12)
NEUTROPHILS NFR BLD AUTO: 8.74 10*3/MM3 (ref 1.7–7)
NEUTROPHILS NFR BLD AUTO: 82.8 % (ref 42.7–76)
NRBC BLD AUTO-RTO: 0 /100 WBC (ref 0–0.2)
PLATELET # BLD AUTO: 235 10*3/MM3 (ref 140–450)
PMV BLD AUTO: 8.4 FL (ref 6–12)
POTASSIUM SERPL-SCNC: 3.4 MMOL/L (ref 3.5–5.2)
PROT SERPL-MCNC: 7 G/DL (ref 6–8.5)
QT INTERVAL: 416 MS
QTC INTERVAL: 419 MS
RBC # BLD AUTO: 5.36 10*6/MM3 (ref 4.14–5.8)
SODIUM SERPL-SCNC: 142 MMOL/L (ref 136–145)
TROPONIN T SERPL HS-MCNC: 8 NG/L
TROPONIN T SERPL HS-MCNC: 9 NG/L
WBC NRBC COR # BLD AUTO: 10.55 10*3/MM3 (ref 3.4–10.8)

## 2024-02-23 PROCEDURE — 93010 ELECTROCARDIOGRAM REPORT: CPT | Performed by: INTERNAL MEDICINE

## 2024-02-23 PROCEDURE — 93005 ELECTROCARDIOGRAM TRACING: CPT | Performed by: STUDENT IN AN ORGANIZED HEALTH CARE EDUCATION/TRAINING PROGRAM

## 2024-02-23 PROCEDURE — 36415 COLL VENOUS BLD VENIPUNCTURE: CPT

## 2024-02-23 PROCEDURE — 84484 ASSAY OF TROPONIN QUANT: CPT | Performed by: STUDENT IN AN ORGANIZED HEALTH CARE EDUCATION/TRAINING PROGRAM

## 2024-02-23 PROCEDURE — 99284 EMERGENCY DEPT VISIT MOD MDM: CPT

## 2024-02-23 PROCEDURE — 71045 X-RAY EXAM CHEST 1 VIEW: CPT

## 2024-02-23 PROCEDURE — 83690 ASSAY OF LIPASE: CPT | Performed by: STUDENT IN AN ORGANIZED HEALTH CARE EDUCATION/TRAINING PROGRAM

## 2024-02-23 PROCEDURE — 85025 COMPLETE CBC W/AUTO DIFF WBC: CPT | Performed by: STUDENT IN AN ORGANIZED HEALTH CARE EDUCATION/TRAINING PROGRAM

## 2024-02-23 PROCEDURE — 85379 FIBRIN DEGRADATION QUANT: CPT | Performed by: STUDENT IN AN ORGANIZED HEALTH CARE EDUCATION/TRAINING PROGRAM

## 2024-02-23 PROCEDURE — 80053 COMPREHEN METABOLIC PANEL: CPT | Performed by: STUDENT IN AN ORGANIZED HEALTH CARE EDUCATION/TRAINING PROGRAM

## 2024-02-23 NOTE — ED PROVIDER NOTES
EMERGENCY DEPARTMENT ENCOUNTER  Room Number:  19/19  PCP: Provider, No Known  Independent Historians: Patient and Family      HPI:  Chief Complaint: had concerns including Hypertension.     Context: Marcial Ramirez is a 69 y.o. male with a medical history of obesity, hypertension who presents to the ED c/o acute hypertension.  Patient was seen in urgent care today and found to have chest pain and elevated blood pressure.  Patient sent to the emergency department for further evaluation.  Patient states he previously was on antihypertensive medications for period of 4 to 5 months but took himself off as his blood pressure normalized.  Patient states he had some GI illnesses over the weekend including some nausea and vomiting that has since resolved.  Today when patient checked his blood pressure he found his systolic to be over 200 and so went to urgent care.  Patient states he is still having some discomfort in his chest that is constant and aching-like.  Pain is located at the right costosternal junction.  Patient thinks this is a pulled muscle as it is very superficial feeling.  Patient denies previous cardiac history.      Review of prior external notes (non-ED) -and- Review of prior external test results outside of this encounter:   Urgent care visit from earlier today reviewed and notable for history of hypertension, elevated blood pressure, nausea and dizziness.  There patient complained of chest soreness and was sent to the emergency department for further evaluation.      PAST MEDICAL HISTORY  Active Ambulatory Problems     Diagnosis Date Noted    Allergic rhinitis due to allergen 10/10/2016    Cough 10/10/2016    Nausea 10/10/2016    Edema 10/10/2016    Obesity due to excess calories 10/10/2016     Resolved Ambulatory Problems     Diagnosis Date Noted    No Resolved Ambulatory Problems     Past Medical History:   Diagnosis Date    GERD (gastroesophageal reflux disease)          PAST SURGICAL HISTORY  Past  Surgical History:   Procedure Laterality Date    HERNIA REPAIR      SKIN CANCER EXCISION      left lower leg         FAMILY HISTORY  Family History   Problem Relation Age of Onset    Heart disease Mother     Heart failure Mother     Heart disease Father     COPD Father     Stroke Brother          SOCIAL HISTORY  Social History     Socioeconomic History    Marital status:    Tobacco Use    Smokeless tobacco: Never    Tobacco comments:     none in months, usually with golf   Substance and Sexual Activity    Alcohol use: Yes     Alcohol/week: 21.0 standard drinks of alcohol     Types: 21 Glasses of wine per week    Drug use: No    Sexual activity: Defer         ALLERGIES  Patient has no known allergies.      REVIEW OF SYSTEMS  Review of Systems  Included in HPI  All systems reviewed and negative except for those discussed in HPI.      PHYSICAL EXAM    I have reviewed the triage vital signs and nursing notes.    ED Triage Vitals [02/23/24 1145]   Temp Heart Rate Resp BP SpO2   98.1 °F (36.7 °C) 63 16 174/84 98 %      Temp src Heart Rate Source Patient Position BP Location FiO2 (%)   Tympanic Monitor -- -- --       Physical Exam  GENERAL: alert, no acute distress  SKIN: Warm, dry  HENT: Normocephalic, atraumatic  EYES: no scleral icterus  CV: regular rhythm, regular rate  RESPIRATORY: normal effort, lungs clear  ABDOMEN: soft, nontender, nondistended  MUSCULOSKELETAL: no deformity.  Tenderness to palpation of the right chest wall most prominent at the right costosternal junction  NEURO: alert, moves all extremities, follows commands      LAB RESULTS  Recent Results (from the past 24 hour(s))   POCT GLUCOSE FINGERSTICK    Collection Time: 02/23/24 10:40 AM    Specimen type and source: Blood   Result Value Ref Range    Glucose 100 (A) 70 - 99 mg/dL    QC Yes     Lot Number [id number]     Expiration Date 6,[id number]     Comment     Comprehensive Metabolic Panel    Collection Time: 02/23/24 11:59 AM    Specimen: Blood    Result Value Ref Range    Glucose 92 65 - 99 mg/dL    BUN 10 8 - 23 mg/dL    Creatinine 0.83 0.76 - 1.27 mg/dL    Sodium 142 136 - 145 mmol/L    Potassium 3.4 (L) 3.5 - 5.2 mmol/L    Chloride 108 (H) 98 - 107 mmol/L    CO2 18.8 (L) 22.0 - 29.0 mmol/L    Calcium 8.5 (L) 8.6 - 10.5 mg/dL    Total Protein 7.0 6.0 - 8.5 g/dL    Albumin 4.0 3.5 - 5.2 g/dL    ALT (SGPT) 11 1 - 41 U/L    AST (SGOT) 8 1 - 40 U/L    Alkaline Phosphatase 73 39 - 117 U/L    Total Bilirubin 0.8 0.0 - 1.2 mg/dL    Globulin 3.0 gm/dL    A/G Ratio 1.3 g/dL    BUN/Creatinine Ratio 12.0 7.0 - 25.0    Anion Gap 15.2 (H) 5.0 - 15.0 mmol/L    eGFR 93.6 >60.0 mL/min/1.73   Single High Sensitivity Troponin T    Collection Time: 02/23/24 11:59 AM    Specimen: Blood   Result Value Ref Range    HS Troponin T 9 <22 ng/L   CBC Auto Differential    Collection Time: 02/23/24 11:59 AM    Specimen: Blood   Result Value Ref Range    WBC 10.55 3.40 - 10.80 10*3/mm3    RBC 5.36 4.14 - 5.80 10*6/mm3    Hemoglobin 15.7 13.0 - 17.7 g/dL    Hematocrit 47.1 37.5 - 51.0 %    MCV 87.9 79.0 - 97.0 fL    MCH 29.3 26.6 - 33.0 pg    MCHC 33.3 31.5 - 35.7 g/dL    RDW 12.0 (L) 12.3 - 15.4 %    RDW-SD 38.4 37.0 - 54.0 fl    MPV 8.4 6.0 - 12.0 fL    Platelets 235 140 - 450 10*3/mm3    Neutrophil % 82.8 (H) 42.7 - 76.0 %    Lymphocyte % 11.2 (L) 19.6 - 45.3 %    Monocyte % 4.6 (L) 5.0 - 12.0 %    Eosinophil % 0.4 0.3 - 6.2 %    Basophil % 0.5 0.0 - 1.5 %    Immature Grans % 0.5 0.0 - 0.5 %    Neutrophils, Absolute 8.74 (H) 1.70 - 7.00 10*3/mm3    Lymphocytes, Absolute 1.18 0.70 - 3.10 10*3/mm3    Monocytes, Absolute 0.49 0.10 - 0.90 10*3/mm3    Eosinophils, Absolute 0.04 0.00 - 0.40 10*3/mm3    Basophils, Absolute 0.05 0.00 - 0.20 10*3/mm3    Immature Grans, Absolute 0.05 0.00 - 0.05 10*3/mm3    nRBC 0.0 0.0 - 0.2 /100 WBC   Lipase    Collection Time: 02/23/24 11:59 AM    Specimen: Blood   Result Value Ref Range    Lipase 21 13 - 60 U/L   D-dimer, Quantitative    Collection Time:  02/23/24 12:10 PM    Specimen: Blood   Result Value Ref Range    D-Dimer, Quantitative 0.27 0.00 - 0.71 MCGFEU/mL   ECG 12 Lead Chest Pain    Collection Time: 02/23/24 12:14 PM   Result Value Ref Range    QT Interval 416 ms    QTC Interval 419 ms   Single High Sensitivity Troponin T    Collection Time: 02/23/24  1:43 PM    Specimen: Blood   Result Value Ref Range    HS Troponin T 8 <22 ng/L         RADIOLOGY  XR Chest 1 View    Result Date: 2/23/2024  XR CHEST 1 VW-  HISTORY: Male who is 69 years-old, chest pain  TECHNIQUE: Frontal view of the chest  COMPARISON: 10/13/2020  FINDINGS: Heart, mediastinum and pulmonary vasculature are unremarkable. No focal pulmonary consolidation, pleural effusion, or pneumothorax. No acute osseous process.      No evidence for acute pulmonary process. Follow-up as clinical indications persist.  This report was finalized on 2/23/2024 12:28 PM by Dr. Cordell Stephens M.D on Workstation: [id number]         MEDICATIONS GIVEN IN ER  Medications - No data to display      ORDERS PLACED DURING THIS VISIT:  Orders Placed This Encounter   Procedures    XR Chest 1 View    Comprehensive Metabolic Panel    Single High Sensitivity Troponin T    CBC Auto Differential    Lipase    D-dimer, Quantitative    Single High Sensitivity Troponin T    Ambulatory Referral [facility] ([facility] Only)    ECG 12 Lead Chest Pain    CBC & Differential         OUTPATIENT MEDICATION MANAGEMENT:  No current Epic-ordered facility-administered medications on file.     No current [facility]-ordered outpatient medications on file.         PROGRESS, DATA ANALYSIS, CONSULTS, AND MEDICAL DECISION MAKING  All labs have been independently interpreted by me.  All radiology studies have been reviewed by me. All EKG's have been independently viewed and interpreted by me.  Discussion below represents my analysis of pertinent findings related to patient's condition, differential diagnosis, treatment plan and final  disposition.    Differential diagnosis includes but is not limited to ACS, MSK causes, costochondritis, PE, aortic pathology.    Clinical Scores: HEART Score: 3                   ED Course as of 02/23/24 1426   Fri Feb 23, 2024   1209 Chest x-ray interpreted by me demonstrates elevated right diaphragm with no evidence of consolidation [MW]   1222 EKG interpreted by me demonstrates sinus rhythm, rate 61, no MA/QT prolongation, no ST elevation [MW]   1247 CO2(!): 18.8 [MW]   1247 Potassium(!): 3.4 [MW]   1251 Workup in the emergency department is overall unremarkable with the exception of mild acidosis and mild hypokalemia.  Most notably troponin and D-dimer are within normal limits which helps reduce patient's risk for emergent pathology such as ACS, PE, aortic dissection.  Chest x-ray within normal limits and no noted mediastinal widening or pneumonia.  Given unremarkable workup in the emergency department I believe patient is safe for follow-up on outpatient basis.  Will place ambulatory referral to cardiology for further evaluation of chest pain.  Patient additionally counseled to follow-up with primary care regarding chest pain and hypertension.  Supportive care instructions and return precautions provided. [MW]      ED Course User Index  [MW] Darwin Bateman MD             AS OF 14:26 EST VITALS:    BP - 148/91  HR - 66  TEMP - 98.1 °F (36.7 °C) (Tympanic)  O2 SATS - 94%    COMPLEXITY OF CARE  Admission was considered but after careful review of the patient's presentation, physical examination, diagnostic results, and response to treatment the patient may be safely discharged with outpatient follow-up.      DIAGNOSIS  Final diagnoses:   Chest pain, unspecified type   Hypertension, unspecified type         DISPOSITION  ED Disposition       ED Disposition   Discharge    Condition   Stable    Comment   --                Please note that portions of this document were completed with a voice recognition  program.    Note Disclaimer: At [facility], we believe that sharing information builds trust and better relationships. You are receiving this note because you recently visited [facility]. It is possible you will see health information before a provider has talked with you about it. This kind of information can be easy to misunderstand. To help you fully understand what it means for your health, we urge you to discuss this note with your provider.         Darwin Bateman MD  02/23/24 2023

## 2024-02-23 NOTE — DISCHARGE INSTRUCTIONS
Please follow-up with your primary care physician for repeat evaluation and blood pressure check    I have placed a referral to cardiology for chest pain evaluation, they will contact you to schedule this    Please return to the emergency department with new or worsening symptoms including but not limited to worsening chest pain, shortness of breath, lightheadedness, dizziness, fevers, chills.

## 2024-02-23 NOTE — ED NOTES
Pt took bp at home and sbp was 180.  At [facility] his sbp was 140.  He is not on bp meds but used to be on them.  [facility] did EKG - pt had some cp that has resolved.